# Patient Record
Sex: MALE | Race: WHITE | NOT HISPANIC OR LATINO
[De-identification: names, ages, dates, MRNs, and addresses within clinical notes are randomized per-mention and may not be internally consistent; named-entity substitution may affect disease eponyms.]

---

## 2017-08-28 ENCOUNTER — RECORD ABSTRACTING (OUTPATIENT)
Age: 52
End: 2017-08-28

## 2017-08-28 DIAGNOSIS — Z87.898 PERSONAL HISTORY OF OTHER SPECIFIED CONDITIONS: ICD-10-CM

## 2017-08-28 DIAGNOSIS — Z72.0 TOBACCO USE: ICD-10-CM

## 2017-08-28 DIAGNOSIS — Z86.03 PERSONAL HISTORY OF NEOPLASM OF UNCERTAIN BEHAVIOR: ICD-10-CM

## 2017-08-28 DIAGNOSIS — Z87.09 PERSONAL HISTORY OF OTHER DISEASES OF THE RESPIRATORY SYSTEM: ICD-10-CM

## 2017-08-28 DIAGNOSIS — Z87.828 PERSONAL HISTORY OF OTHER (HEALED) PHYSICAL INJURY AND TRAUMA: ICD-10-CM

## 2017-08-28 DIAGNOSIS — Z83.3 FAMILY HISTORY OF DIABETES MELLITUS: ICD-10-CM

## 2017-08-28 DIAGNOSIS — Z80.1 FAMILY HISTORY OF MALIGNANT NEOPLASM OF TRACHEA, BRONCHUS AND LUNG: ICD-10-CM

## 2017-08-28 DIAGNOSIS — Z98.890 OTHER SPECIFIED POSTPROCEDURAL STATES: ICD-10-CM

## 2017-08-28 DIAGNOSIS — M50.10 CERVICAL DISC DISORDER WITH RADICULOPATHY, UNSPECIFIED CERVICAL REGION: ICD-10-CM

## 2017-08-28 DIAGNOSIS — Z80.3 FAMILY HISTORY OF MALIGNANT NEOPLASM OF BREAST: ICD-10-CM

## 2017-08-28 DIAGNOSIS — Z80.2 FAMILY HISTORY OF MALIGNANT NEOPLASM OF OTHER RESPIRATORY AND INTRATHORACIC ORGANS: ICD-10-CM

## 2017-08-28 DIAGNOSIS — Z83.79 FAMILY HISTORY OF OTHER DISEASES OF THE DIGESTIVE SYSTEM: ICD-10-CM

## 2017-08-28 DIAGNOSIS — Z92.89 PERSONAL HISTORY OF OTHER MEDICAL TREATMENT: ICD-10-CM

## 2017-08-28 RX ORDER — PERPHENAZINE/AMITRIPTYLINE HCL 4MG-10MG
1500 TABLET ORAL
Refills: 0 | Status: ACTIVE | COMMUNITY

## 2017-09-12 ENCOUNTER — TRANSCRIPTION ENCOUNTER (OUTPATIENT)
Age: 52
End: 2017-09-12

## 2017-09-12 ENCOUNTER — APPOINTMENT (OUTPATIENT)
Dept: FAMILY MEDICINE | Facility: CLINIC | Age: 52
End: 2017-09-12
Payer: COMMERCIAL

## 2017-09-12 VITALS
SYSTOLIC BLOOD PRESSURE: 98 MMHG | HEIGHT: 67.5 IN | BODY MASS INDEX: 26.37 KG/M2 | DIASTOLIC BLOOD PRESSURE: 66 MMHG | WEIGHT: 170 LBS

## 2017-09-12 PROCEDURE — 36415 COLL VENOUS BLD VENIPUNCTURE: CPT

## 2017-09-12 PROCEDURE — 99214 OFFICE O/P EST MOD 30 MIN: CPT | Mod: 25

## 2017-09-24 LAB
ALBUMIN SERPL ELPH-MCNC: 4.9 G/DL
ALP BLD-CCNC: 84 U/L
ALT SERPL-CCNC: 30 U/L
ANION GAP SERPL CALC-SCNC: 18 MMOL/L
AST SERPL-CCNC: 23 U/L
BILIRUB SERPL-MCNC: 0.6 MG/DL
BUN SERPL-MCNC: 23 MG/DL
CALCIUM SERPL-MCNC: 9.6 MG/DL
CHLORIDE SERPL-SCNC: 101 MMOL/L
CHOLEST SERPL-MCNC: 176 MG/DL
CHOLEST/HDLC SERPL: 5 RATIO
CO2 SERPL-SCNC: 23 MMOL/L
CREAT SERPL-MCNC: 1.3 MG/DL
ESTIMATED AVERAGE GLUCOSE: 126 MG/DL
GLUCOSE SERPL-MCNC: 109 MG/DL
HBA1C MFR BLD HPLC: 6 %
HDLC SERPL-MCNC: 35 MG/DL
LDLC SERPL CALC-MCNC: 115 MG/DL
POTASSIUM SERPL-SCNC: 4.5 MMOL/L
PROT SERPL-MCNC: 7.7 G/DL
SODIUM SERPL-SCNC: 142 MMOL/L
TRIGL SERPL-MCNC: 130 MG/DL

## 2017-10-08 ENCOUNTER — RX RENEWAL (OUTPATIENT)
Age: 52
End: 2017-10-08

## 2017-11-01 ENCOUNTER — RX RENEWAL (OUTPATIENT)
Age: 52
End: 2017-11-01

## 2017-12-01 ENCOUNTER — APPOINTMENT (OUTPATIENT)
Dept: FAMILY MEDICINE | Facility: CLINIC | Age: 52
End: 2017-12-01

## 2017-12-21 ENCOUNTER — RX RENEWAL (OUTPATIENT)
Age: 52
End: 2017-12-21

## 2018-02-20 ENCOUNTER — APPOINTMENT (OUTPATIENT)
Dept: FAMILY MEDICINE | Facility: CLINIC | Age: 53
End: 2018-02-20
Payer: COMMERCIAL

## 2018-02-20 VITALS
BODY MASS INDEX: 27.92 KG/M2 | SYSTOLIC BLOOD PRESSURE: 110 MMHG | HEIGHT: 67.5 IN | WEIGHT: 180 LBS | DIASTOLIC BLOOD PRESSURE: 82 MMHG

## 2018-02-20 DIAGNOSIS — Z87.898 PERSONAL HISTORY OF OTHER SPECIFIED CONDITIONS: ICD-10-CM

## 2018-02-20 LAB — HBA1C MFR BLD HPLC: 6.9

## 2018-02-20 PROCEDURE — 99214 OFFICE O/P EST MOD 30 MIN: CPT | Mod: 25

## 2018-02-20 PROCEDURE — 83036 HEMOGLOBIN GLYCOSYLATED A1C: CPT | Mod: QW

## 2018-02-20 PROCEDURE — 36415 COLL VENOUS BLD VENIPUNCTURE: CPT

## 2018-02-21 PROBLEM — Z87.898 HISTORY OF LATERAL EPICONDYLITIS: Status: ACTIVE | Noted: 2018-02-21

## 2018-03-06 LAB
ALBUMIN SERPL ELPH-MCNC: 4.5 G/DL
ALP BLD-CCNC: 86 U/L
ALT SERPL-CCNC: 36 U/L
ANA PAT FLD IF-IMP: ABNORMAL
ANA SER IF-ACNC: ABNORMAL
ANION GAP SERPL CALC-SCNC: 15 MMOL/L
AST SERPL-CCNC: 26 U/L
B BURGDOR AB SER-IMP: NEGATIVE
B BURGDOR IGM PATRN SER IB-IMP: NEGATIVE
B BURGDOR18/20KD IGM SER QL IB: NORMAL
B BURGDOR18KD IGG SER QL IB: NORMAL
B BURGDOR23KD IGG SER QL IB: NORMAL
B BURGDOR23KD IGM SER QL IB: NORMAL
B BURGDOR28KD AB SER QL IB: NORMAL
B BURGDOR28KD IGG SER QL IB: NORMAL
B BURGDOR30KD AB SER QL IB: NORMAL
B BURGDOR30KD IGG SER QL IB: NORMAL
B BURGDOR31KD IGG SER QL IB: NORMAL
B BURGDOR31KD IGM SER QL IB: NORMAL
B BURGDOR39KD IGG SER QL IB: NORMAL
B BURGDOR39KD IGM SER QL IB: NORMAL
B BURGDOR41KD IGG SER QL IB: NORMAL
B BURGDOR41KD IGM SER QL IB: NORMAL
B BURGDOR45KD AB SER QL IB: NORMAL
B BURGDOR45KD IGG SER QL IB: NORMAL
B BURGDOR58KD AB SER QL IB: NORMAL
B BURGDOR58KD IGG SER QL IB: NORMAL
B BURGDOR66KD IGG SER QL IB: NORMAL
B BURGDOR66KD IGM SER QL IB: NORMAL
B BURGDOR93KD IGG SER QL IB: NORMAL
B BURGDOR93KD IGM SER QL IB: NORMAL
BASOPHILS # BLD AUTO: 0.04 K/UL
BASOPHILS NFR BLD AUTO: 0.6 %
BILIRUB SERPL-MCNC: 0.6 MG/DL
BUN SERPL-MCNC: 19 MG/DL
CALCIUM SERPL-MCNC: 9.5 MG/DL
CHLORIDE SERPL-SCNC: 101 MMOL/L
CHOLEST SERPL-MCNC: 212 MG/DL
CHOLEST/HDLC SERPL: 7.3 RATIO
CO2 SERPL-SCNC: 22 MMOL/L
CREAT SERPL-MCNC: 1.25 MG/DL
EOSINOPHIL # BLD AUTO: 0.12 K/UL
EOSINOPHIL NFR BLD AUTO: 1.8 %
ERYTHROCYTE [SEDIMENTATION RATE] IN BLOOD BY WESTERGREN METHOD: 3 MM/HR
GLUCOSE SERPL-MCNC: 247 MG/DL
HCT VFR BLD CALC: 47.7 %
HDLC SERPL-MCNC: 29 MG/DL
HGB BLD-MCNC: 15.9 G/DL
IMM GRANULOCYTES NFR BLD AUTO: 0.6 %
LDLC SERPL CALC-MCNC: 125 MG/DL
LYMPHOCYTES # BLD AUTO: 2.29 K/UL
LYMPHOCYTES NFR BLD AUTO: 33.9 %
MAN DIFF?: NORMAL
MCHC RBC-ENTMCNC: 29.3 PG
MCHC RBC-ENTMCNC: 33.3 GM/DL
MCV RBC AUTO: 87.8 FL
MONOCYTES # BLD AUTO: 0.39 K/UL
MONOCYTES NFR BLD AUTO: 5.8 %
NEUTROPHILS # BLD AUTO: 3.87 K/UL
NEUTROPHILS NFR BLD AUTO: 57.3 %
PLATELET # BLD AUTO: 243 K/UL
POTASSIUM SERPL-SCNC: 4.4 MMOL/L
PROT SERPL-MCNC: 7.4 G/DL
RBC # BLD: 5.43 M/UL
RBC # FLD: 14 %
SODIUM SERPL-SCNC: 138 MMOL/L
TRIGL SERPL-MCNC: 289 MG/DL
WBC # FLD AUTO: 6.75 K/UL

## 2018-08-19 ENCOUNTER — RX RENEWAL (OUTPATIENT)
Age: 53
End: 2018-08-19

## 2018-08-29 ENCOUNTER — APPOINTMENT (OUTPATIENT)
Dept: FAMILY MEDICINE | Facility: CLINIC | Age: 53
End: 2018-08-29
Payer: COMMERCIAL

## 2018-08-29 VITALS
HEIGHT: 67 IN | BODY MASS INDEX: 28.09 KG/M2 | SYSTOLIC BLOOD PRESSURE: 110 MMHG | DIASTOLIC BLOOD PRESSURE: 60 MMHG | WEIGHT: 179 LBS

## 2018-08-29 DIAGNOSIS — Z87.2 PERSONAL HISTORY OF DISEASES OF THE SKIN AND SUBCUTANEOUS TISSUE: ICD-10-CM

## 2018-08-29 PROCEDURE — 36415 COLL VENOUS BLD VENIPUNCTURE: CPT

## 2018-08-29 PROCEDURE — 99213 OFFICE O/P EST LOW 20 MIN: CPT | Mod: 25

## 2018-08-29 RX ORDER — GLYBURIDE 5 MG/1
5 TABLET ORAL TWICE DAILY
Qty: 180 | Refills: 1 | Status: DISCONTINUED | COMMUNITY
Start: 2017-12-21 | End: 2018-08-29

## 2018-08-29 RX ORDER — GARLIC 1000 MG
1000 CAPSULE ORAL
Refills: 0 | Status: DISCONTINUED | COMMUNITY
End: 2018-08-29

## 2018-08-29 NOTE — HEALTH RISK ASSESSMENT
[Discussed at today's visit] : Advance Directives Discussed at today's visit [HIV test declined] : HIV test declined [Hepatitis C test declined] : Hepatitis C test declined [ColonoscopyDate] : 01/17

## 2018-08-29 NOTE — PHYSICAL EXAM
[No Acute Distress] : no acute distress [Well Developed] : well developed [Normal Oropharynx] : the oropharynx was normal [No Respiratory Distress] : no respiratory distress  [Clear to Auscultation] : lungs were clear to auscultation bilaterally [Normal Rate] : normal rate  [Normal S1, S2] : normal S1 and S2 [Soft] : abdomen soft [Non Tender] : non-tender [Normal Affect] : the affect was normal [Normal Insight/Judgement] : insight and judgment were intact [Comprehensive Foot Exam Normal] : Right and left foot were examined and both feet are normal. No ulcers in either foot. Toes are normal and with full ROM.  Normal tactile sensation with monofilament testing throughout both feet

## 2018-08-29 NOTE — HISTORY OF PRESENT ILLNESS
[FreeTextEntry1] : pt is here for a medication refill Atorvastatin Calcium 20 MG.\par \[facility]  [de-identified] : Harlan is a 52-year old male with PMH of DM who presents for a refill of atorvastatin.\par He also admits that he started seeing an endocrinologist based out of [place]. She discontinued Glyburide and started him on Metformin. He is currently taking 2000mg daily. Harlan reports some GI discomfort but is able to tolerate the medication otherwise.\par \par Also reports a wart on the left great toe that wears away after soaking in the pool but returns. He notices improvement with OTC medicated pads but notes that it always comes back.

## 2018-08-30 LAB
ALBUMIN SERPL ELPH-MCNC: 4.5 G/DL
ALP BLD-CCNC: 71 U/L
ALT SERPL-CCNC: 32 U/L
ANION GAP SERPL CALC-SCNC: 16 MMOL/L
AST SERPL-CCNC: 33 U/L
BILIRUB SERPL-MCNC: 0.8 MG/DL
BUN SERPL-MCNC: 24 MG/DL
CALCIUM SERPL-MCNC: 9.4 MG/DL
CHLORIDE SERPL-SCNC: 105 MMOL/L
CHOLEST SERPL-MCNC: 183 MG/DL
CHOLEST/HDLC SERPL: 5.4 RATIO
CO2 SERPL-SCNC: 22 MMOL/L
CREAT SERPL-MCNC: 1.26 MG/DL
GLUCOSE SERPL-MCNC: 119 MG/DL
HBA1C MFR BLD HPLC: 6.8 %
HDLC SERPL-MCNC: 34 MG/DL
LDLC SERPL CALC-MCNC: 125 MG/DL
POTASSIUM SERPL-SCNC: 4.3 MMOL/L
PROT SERPL-MCNC: 7.5 G/DL
SODIUM SERPL-SCNC: 143 MMOL/L
TRIGL SERPL-MCNC: 121 MG/DL

## 2018-09-06 ENCOUNTER — RX RENEWAL (OUTPATIENT)
Age: 53
End: 2018-09-06

## 2018-09-07 ENCOUNTER — RX RENEWAL (OUTPATIENT)
Age: 53
End: 2018-09-07

## 2018-09-10 ENCOUNTER — RX RENEWAL (OUTPATIENT)
Age: 53
End: 2018-09-10

## 2018-09-10 ENCOUNTER — TRANSCRIPTION ENCOUNTER (OUTPATIENT)
Age: 53
End: 2018-09-10

## 2018-09-24 ENCOUNTER — RX RENEWAL (OUTPATIENT)
Age: 53
End: 2018-09-24

## 2018-10-12 ENCOUNTER — APPOINTMENT (OUTPATIENT)
Dept: FAMILY MEDICINE | Facility: CLINIC | Age: 53
End: 2018-10-12
Payer: COMMERCIAL

## 2018-10-12 VITALS
SYSTOLIC BLOOD PRESSURE: 110 MMHG | DIASTOLIC BLOOD PRESSURE: 62 MMHG | BODY MASS INDEX: 27.94 KG/M2 | WEIGHT: 178 LBS | HEIGHT: 67 IN

## 2018-10-12 DIAGNOSIS — L20.9 ATOPIC DERMATITIS, UNSPECIFIED: ICD-10-CM

## 2018-10-12 PROCEDURE — 99213 OFFICE O/P EST LOW 20 MIN: CPT

## 2018-10-19 NOTE — HISTORY OF PRESENT ILLNESS
[FreeTextEntry8] : patient is here for bites marks that appeared on his legs, groin and neck, that started to appear 3 weeks ago. He thought he had fleas in his house from his dog, and cleaned his whole house twice to get rid of any bugs. But after he started to get itchy and noticed the bites. pt states he is the only one in his house with these marks.

## 2018-10-19 NOTE — REVIEW OF SYSTEMS
[Itching] : itching [Mole Changes] : no mole changes [Nail Changes] : no nail changes [Hair Changes] : no hair changes [Skin Rash] : skin rash [Negative] : Constitutional

## 2018-10-19 NOTE — PHYSICAL EXAM
[No Acute Distress] : no acute distress [Well Nourished] : well nourished [Well Developed] : well developed [Well-Appearing] : well-appearing [de-identified] : atopic rash LE

## 2018-10-29 ENCOUNTER — APPOINTMENT (OUTPATIENT)
Dept: FAMILY MEDICINE | Facility: CLINIC | Age: 53
End: 2018-10-29

## 2018-12-05 ENCOUNTER — RX RENEWAL (OUTPATIENT)
Age: 53
End: 2018-12-05

## 2019-02-25 ENCOUNTER — RX RENEWAL (OUTPATIENT)
Age: 54
End: 2019-02-25

## 2019-02-28 ENCOUNTER — APPOINTMENT (OUTPATIENT)
Dept: FAMILY MEDICINE | Facility: CLINIC | Age: 54
End: 2019-02-28
Payer: COMMERCIAL

## 2019-02-28 VITALS
DIASTOLIC BLOOD PRESSURE: 78 MMHG | WEIGHT: 172 LBS | BODY MASS INDEX: 27 KG/M2 | SYSTOLIC BLOOD PRESSURE: 110 MMHG | HEIGHT: 67 IN

## 2019-02-28 PROCEDURE — 99213 OFFICE O/P EST LOW 20 MIN: CPT | Mod: 25

## 2019-02-28 PROCEDURE — 36415 COLL VENOUS BLD VENIPUNCTURE: CPT

## 2019-02-28 NOTE — REVIEW OF SYSTEMS
[Negative] : Integumentary [Itching] : no itching [Nail Changes] : no nail changes [Hair Changes] : no hair changes [Skin Rash] : no skin rash [FreeTextEntry9] : elbows ache especially after golf

## 2019-02-28 NOTE — HISTORY OF PRESENT ILLNESS
[FreeTextEntry1] : The patient is here for medication refills on atorvastatin 20 mg\par \par 1 refill in [facility] in [place]. \[facility] . [de-identified] : Doing well except for bilat elbow pain . Chronic.

## 2019-02-28 NOTE — PHYSICAL EXAM
[No Acute Distress] : no acute distress [Well Nourished] : well nourished [Well Developed] : well developed [Well-Appearing] : well-appearing [No Respiratory Distress] : no respiratory distress  [Clear to Auscultation] : lungs were clear to auscultation bilaterally [Normal Rate] : normal rate  [Regular Rhythm] : with a regular rhythm [Normal S1, S2] : normal S1 and S2 [No Carotid Bruits] : no carotid bruits

## 2019-03-02 ENCOUNTER — TRANSCRIPTION ENCOUNTER (OUTPATIENT)
Age: 54
End: 2019-03-02

## 2019-03-13 LAB
ALBUMIN SERPL ELPH-MCNC: 5 G/DL
ALP BLD-CCNC: 66 U/L
ALT SERPL-CCNC: 26 U/L
ANION GAP SERPL CALC-SCNC: 23 MMOL/L
AST SERPL-CCNC: 24 U/L
BILIRUB SERPL-MCNC: 0.6 MG/DL
BUN SERPL-MCNC: 21 MG/DL
CALCIUM SERPL-MCNC: 9.8 MG/DL
CHLORIDE SERPL-SCNC: 100 MMOL/L
CHOLEST SERPL-MCNC: 210 MG/DL
CHOLEST/HDLC SERPL: 6.6 RATIO
CO2 SERPL-SCNC: 20 MMOL/L
CREAT SERPL-MCNC: 1.33 MG/DL
ESTIMATED AVERAGE GLUCOSE: 148 MG/DL
GLUCOSE SERPL-MCNC: 131 MG/DL
HBA1C MFR BLD HPLC: 6.8 %
HDLC SERPL-MCNC: 32 MG/DL
LDLC SERPL CALC-MCNC: 137 MG/DL
POTASSIUM SERPL-SCNC: 4.5 MMOL/L
PROT SERPL-MCNC: 7.4 G/DL
SODIUM SERPL-SCNC: 143 MMOL/L
TRIGL SERPL-MCNC: 203 MG/DL

## 2019-03-15 ENCOUNTER — APPOINTMENT (OUTPATIENT)
Dept: FAMILY MEDICINE | Facility: CLINIC | Age: 54
End: 2019-03-15
Payer: COMMERCIAL

## 2019-03-15 VITALS
BODY MASS INDEX: 27 KG/M2 | HEIGHT: 67 IN | SYSTOLIC BLOOD PRESSURE: 118 MMHG | WEIGHT: 172 LBS | DIASTOLIC BLOOD PRESSURE: 62 MMHG

## 2019-03-15 PROCEDURE — 99213 OFFICE O/P EST LOW 20 MIN: CPT

## 2019-03-19 NOTE — HISTORY OF PRESENT ILLNESS
[FreeTextEntry1] : Patient is here to go over blood work results and med refill. Fenofibric Acid 135 MG.\par \[facility]  [de-identified] : as per cc

## 2019-07-01 ENCOUNTER — RX RENEWAL (OUTPATIENT)
Age: 54
End: 2019-07-01

## 2019-09-12 ENCOUNTER — RX RENEWAL (OUTPATIENT)
Age: 54
End: 2019-09-12

## 2019-09-29 ENCOUNTER — RX RENEWAL (OUTPATIENT)
Age: 54
End: 2019-09-29

## 2019-11-21 ENCOUNTER — APPOINTMENT (OUTPATIENT)
Dept: FAMILY MEDICINE | Facility: CLINIC | Age: 54
End: 2019-11-21
Payer: COMMERCIAL

## 2019-11-21 VITALS
WEIGHT: 174 LBS | HEIGHT: 67 IN | SYSTOLIC BLOOD PRESSURE: 110 MMHG | BODY MASS INDEX: 27.31 KG/M2 | HEART RATE: 77 BPM | OXYGEN SATURATION: 96 % | DIASTOLIC BLOOD PRESSURE: 80 MMHG

## 2019-11-21 PROCEDURE — 36415 COLL VENOUS BLD VENIPUNCTURE: CPT

## 2019-11-21 PROCEDURE — 99214 OFFICE O/P EST MOD 30 MIN: CPT | Mod: 25

## 2019-11-21 NOTE — PHYSICAL EXAM
[Normal] : normal rate, regular rhythm, normal S1 and S2 and no murmur heard [No Carotid Bruits] : no carotid bruits [No Edema] : there was no peripheral edema [de-identified] : L foot middle toe with cyst medially

## 2019-11-21 NOTE — HISTORY OF PRESENT ILLNESS
[FreeTextEntry1] : Patient here for med renewal [de-identified] : Has cyst L foot,\par was removed by podiary but has returned

## 2019-11-21 NOTE — HEALTH RISK ASSESSMENT
[Yes] : Yes [Monthly or less (1 pt)] : Monthly or less (1 point) [1 or 2 (0 pts)] : 1 or 2 (0 points) [Never (0 pts)] : Never (0 points) [No] : In the past 12 months have you used drugs other than those required for medical reasons? No [No falls in past year] : Patient reported no falls in the past year [0] : 2) Feeling down, depressed, or hopeless: Not at all (0) [Audit-CScore] : 1 [[id number]] : 0

## 2019-11-22 LAB
ALBUMIN SERPL ELPH-MCNC: 4.6 G/DL
ALP BLD-CCNC: 64 U/L
ALT SERPL-CCNC: 24 U/L
ANION GAP SERPL CALC-SCNC: 16 MMOL/L
AST SERPL-CCNC: 19 U/L
BILIRUB SERPL-MCNC: 0.3 MG/DL
BUN SERPL-MCNC: 20 MG/DL
CALCIUM SERPL-MCNC: 9.5 MG/DL
CHLORIDE SERPL-SCNC: 108 MMOL/L
CHOLEST SERPL-MCNC: 167 MG/DL
CHOLEST/HDLC SERPL: 5.2 RATIO
CO2 SERPL-SCNC: 18 MMOL/L
CREAT SERPL-MCNC: 1.08 MG/DL
GLUCOSE SERPL-MCNC: 127 MG/DL
HDLC SERPL-MCNC: 32 MG/DL
LDLC SERPL CALC-MCNC: 97 MG/DL
POTASSIUM SERPL-SCNC: 4.4 MMOL/L
PROT SERPL-MCNC: 7 G/DL
SODIUM SERPL-SCNC: 142 MMOL/L
TRIGL SERPL-MCNC: 191 MG/DL

## 2019-11-28 LAB
ESTIMATED AVERAGE GLUCOSE: 134 MG/DL
HBA1C MFR BLD HPLC: 6.3 %

## 2019-12-28 ENCOUNTER — RX RENEWAL (OUTPATIENT)
Age: 54
End: 2019-12-28

## 2020-01-27 ENCOUNTER — APPOINTMENT (OUTPATIENT)
Dept: FAMILY MEDICINE | Facility: CLINIC | Age: 55
End: 2020-01-27
Payer: COMMERCIAL

## 2020-01-27 VITALS
OXYGEN SATURATION: 95 % | HEIGHT: 67 IN | DIASTOLIC BLOOD PRESSURE: 78 MMHG | BODY MASS INDEX: 28.56 KG/M2 | WEIGHT: 182 LBS | SYSTOLIC BLOOD PRESSURE: 122 MMHG | HEART RATE: 80 BPM | TEMPERATURE: 98.3 F

## 2020-01-27 DIAGNOSIS — Z01.818 ENCOUNTER FOR OTHER PREPROCEDURAL EXAMINATION: ICD-10-CM

## 2020-01-27 DIAGNOSIS — M71.30 OTHER BURSAL CYST, UNSPECIFIED SITE: ICD-10-CM

## 2020-01-27 PROCEDURE — 99214 OFFICE O/P EST MOD 30 MIN: CPT

## 2020-01-29 RX ORDER — METHYLPREDNISOLONE 4 MG/1
4 TABLET ORAL
Qty: 1 | Refills: 1 | Status: DISCONTINUED | COMMUNITY
Start: 2018-10-12 | End: 2020-01-29

## 2020-01-29 NOTE — HISTORY OF PRESENT ILLNESS
[No Pertinent Cardiac History] : no history of aortic stenosis, atrial fibrillation, coronary artery disease, recent myocardial infarction, or implantable device/pacemaker [No Pertinent Pulmonary History] : no history of asthma, COPD, sleep apnea, or smoking [No Adverse Anesthesia Reaction] : no adverse anesthesia reaction in self or family member [Diabetes] : diabetes [(Patient denies any chest pain, claudication, dyspnea on exertion, orthopnea, palpitations or syncope)] : Patient denies any chest pain, claudication, dyspnea on exertion, orthopnea, palpitations or syncope [Chronic Anticoagulation] : no chronic anticoagulation [Chronic Kidney Disease] : no chronic kidney disease [FreeTextEntry2] : 01/31/20 [FreeTextEntry1] : Cyst removal of toe [FreeTextEntry4] : Patient is here for a pre-op appointment. Surgery for Friday having a cyst removed from right big toe.

## 2020-05-19 ENCOUNTER — RX RENEWAL (OUTPATIENT)
Age: 55
End: 2020-05-19

## 2020-06-04 ENCOUNTER — APPOINTMENT (OUTPATIENT)
Dept: FAMILY MEDICINE | Facility: CLINIC | Age: 55
End: 2020-06-04
Payer: COMMERCIAL

## 2020-06-04 VITALS
SYSTOLIC BLOOD PRESSURE: 124 MMHG | OXYGEN SATURATION: 95 % | BODY MASS INDEX: 28.25 KG/M2 | HEIGHT: 67 IN | DIASTOLIC BLOOD PRESSURE: 70 MMHG | TEMPERATURE: 98.2 F | WEIGHT: 180 LBS | HEART RATE: 85 BPM

## 2020-06-04 PROCEDURE — 36415 COLL VENOUS BLD VENIPUNCTURE: CPT

## 2020-06-04 PROCEDURE — 99214 OFFICE O/P EST MOD 30 MIN: CPT | Mod: 25

## 2020-06-04 RX ORDER — ENALAPRIL MALEATE 2.5 MG/1
2.5 TABLET ORAL
Refills: 0 | Status: ACTIVE | COMMUNITY
Start: 2020-06-04

## 2020-06-04 RX ORDER — SITAGLIPTIN 100 MG/1
100 TABLET, FILM COATED ORAL
Refills: 0 | Status: ACTIVE | COMMUNITY
Start: 2020-06-04

## 2020-06-04 RX ORDER — METFORMIN HYDROCHLORIDE 500 MG/1
500 TABLET, COATED ORAL
Qty: 360 | Refills: 1 | Status: ACTIVE | COMMUNITY
Start: 2018-08-29 | End: 1900-01-01

## 2020-06-04 NOTE — PHYSICAL EXAM
[Normal] : no respiratory distress, lungs were clear to auscultation bilaterally and no accessory muscle use [No Carotid Bruits] : no carotid bruits [Normal Affect] : the affect was normal [No Edema] : there was no peripheral edema [Alert and Oriented x3] : oriented to person, place, and time [Normal Insight/Judgement] : insight and judgment were intact

## 2020-06-04 NOTE — HISTORY OF PRESENT ILLNESS
[de-identified] : \par \par Endo added januvia and enalapril [FreeTextEntry1] : patient is here for medication refills on atorvastatin 20 mg, metformin hcl 500 mg\par

## 2020-06-22 LAB
ALBUMIN SERPL ELPH-MCNC: 4.8 G/DL
ALP BLD-CCNC: 70 U/L
ALT SERPL-CCNC: 24 U/L
ANION GAP SERPL CALC-SCNC: 16 MMOL/L
AST SERPL-CCNC: 24 U/L
BASOPHILS # BLD AUTO: 0.03 K/UL
BASOPHILS NFR BLD AUTO: 0.4 %
BILIRUB SERPL-MCNC: 0.6 MG/DL
BUN SERPL-MCNC: 21 MG/DL
CALCIUM SERPL-MCNC: 9.8 MG/DL
CHLORIDE SERPL-SCNC: 103 MMOL/L
CHOLEST SERPL-MCNC: 173 MG/DL
CHOLEST/HDLC SERPL: 5.3 RATIO
CO2 SERPL-SCNC: 21 MMOL/L
CREAT SERPL-MCNC: 1.29 MG/DL
EOSINOPHIL # BLD AUTO: 0.12 K/UL
EOSINOPHIL NFR BLD AUTO: 1.6 %
ESTIMATED AVERAGE GLUCOSE: 137 MG/DL
GLUCOSE SERPL-MCNC: 167 MG/DL
HBA1C MFR BLD HPLC: 6.4 %
HCT VFR BLD CALC: 50.4 %
HDLC SERPL-MCNC: 33 MG/DL
HGB BLD-MCNC: 15.6 G/DL
IMM GRANULOCYTES NFR BLD AUTO: 0.5 %
LDLC SERPL CALC-MCNC: 109 MG/DL
LYMPHOCYTES # BLD AUTO: 2.51 K/UL
LYMPHOCYTES NFR BLD AUTO: 33.5 %
MAN DIFF?: NORMAL
MCHC RBC-ENTMCNC: 28.7 PG
MCHC RBC-ENTMCNC: 31 GM/DL
MCV RBC AUTO: 92.6 FL
MONOCYTES # BLD AUTO: 0.47 K/UL
MONOCYTES NFR BLD AUTO: 6.3 %
NEUTROPHILS # BLD AUTO: 4.32 K/UL
NEUTROPHILS NFR BLD AUTO: 57.7 %
PLATELET # BLD AUTO: 265 K/UL
POTASSIUM SERPL-SCNC: 4.3 MMOL/L
PROT SERPL-MCNC: 7.2 G/DL
PSA SERPL-MCNC: 2.47 NG/ML
RBC # BLD: 5.44 M/UL
RBC # FLD: 13.6 %
SODIUM SERPL-SCNC: 139 MMOL/L
TRIGL SERPL-MCNC: 158 MG/DL
WBC # FLD AUTO: 7.49 K/UL

## 2020-07-30 ENCOUNTER — TRANSCRIPTION ENCOUNTER (OUTPATIENT)
Age: 55
End: 2020-07-30

## 2020-11-18 ENCOUNTER — RX RENEWAL (OUTPATIENT)
Age: 55
End: 2020-11-18

## 2021-03-02 ENCOUNTER — RX RENEWAL (OUTPATIENT)
Age: 56
End: 2021-03-02

## 2021-03-04 ENCOUNTER — NON-APPOINTMENT (OUTPATIENT)
Age: 56
End: 2021-03-04

## 2021-03-04 ENCOUNTER — RX RENEWAL (OUTPATIENT)
Age: 56
End: 2021-03-04

## 2021-03-11 ENCOUNTER — APPOINTMENT (OUTPATIENT)
Dept: FAMILY MEDICINE | Facility: CLINIC | Age: 56
End: 2021-03-11
Payer: COMMERCIAL

## 2021-03-11 VITALS
SYSTOLIC BLOOD PRESSURE: 122 MMHG | WEIGHT: 178 LBS | HEIGHT: 67 IN | TEMPERATURE: 98.4 F | HEART RATE: 87 BPM | OXYGEN SATURATION: 97 % | BODY MASS INDEX: 27.94 KG/M2 | DIASTOLIC BLOOD PRESSURE: 70 MMHG

## 2021-03-11 DIAGNOSIS — E78.5 HYPERLIPIDEMIA, UNSPECIFIED: ICD-10-CM

## 2021-03-11 PROCEDURE — 36415 COLL VENOUS BLD VENIPUNCTURE: CPT

## 2021-03-11 PROCEDURE — 99072 ADDL SUPL MATRL&STAF TM PHE: CPT

## 2021-03-11 PROCEDURE — 99214 OFFICE O/P EST MOD 30 MIN: CPT | Mod: 25

## 2021-03-11 NOTE — HISTORY OF PRESENT ILLNESS
[FreeTextEntry1] : patient is here for medication follow up [de-identified] : Labs reviewed , were done by endo\par \par Endo added januvia and enalapril\par and increase atorvastatin to 40mg

## 2021-03-12 LAB — PSA SERPL-MCNC: 2.35 NG/ML

## 2021-08-29 ENCOUNTER — RX RENEWAL (OUTPATIENT)
Age: 56
End: 2021-08-29

## 2021-11-30 ENCOUNTER — RX RENEWAL (OUTPATIENT)
Age: 56
End: 2021-11-30

## 2021-11-30 RX ORDER — FENOFIBRIC ACID 135 MG/1
135 CAPSULE, DELAYED RELEASE ORAL
Qty: 90 | Refills: 0 | Status: ACTIVE | COMMUNITY
Start: 2021-08-29 | End: 1900-01-01

## 2022-02-25 ENCOUNTER — RX RENEWAL (OUTPATIENT)
Age: 57
End: 2022-02-25

## 2022-03-21 ENCOUNTER — TRANSCRIPTION ENCOUNTER (OUTPATIENT)
Age: 57
End: 2022-03-21

## 2022-07-05 ENCOUNTER — NON-APPOINTMENT (OUTPATIENT)
Age: 57
End: 2022-07-05

## 2022-07-05 ENCOUNTER — LABORATORY RESULT (OUTPATIENT)
Age: 57
End: 2022-07-05

## 2022-07-05 ENCOUNTER — APPOINTMENT (OUTPATIENT)
Dept: FAMILY MEDICINE | Facility: CLINIC | Age: 57
End: 2022-07-05

## 2022-07-05 VITALS
TEMPERATURE: 97.2 F | OXYGEN SATURATION: 95 % | SYSTOLIC BLOOD PRESSURE: 106 MMHG | HEART RATE: 81 BPM | WEIGHT: 174 LBS | BODY MASS INDEX: 27.25 KG/M2 | DIASTOLIC BLOOD PRESSURE: 70 MMHG

## 2022-07-05 DIAGNOSIS — K21.9 GASTRO-ESOPHAGEAL REFLUX DISEASE W/OUT ESOPHAGITIS: ICD-10-CM

## 2022-07-05 DIAGNOSIS — Z00.00 ENCOUNTER FOR GENERAL ADULT MEDICAL EXAMINATION W/OUT ABNORMAL FINDINGS: ICD-10-CM

## 2022-07-05 LAB
BILIRUB UR QL STRIP: NEGATIVE
GLUCOSE UR-MCNC: NEGATIVE
HCG UR QL: 0.2 EU/DL
HGB UR QL STRIP.AUTO: NORMAL
KETONES UR-MCNC: NEGATIVE
LEUKOCYTE ESTERASE UR QL STRIP: NEGATIVE
NITRITE UR QL STRIP: NEGATIVE
PH UR STRIP: 6
PROT UR STRIP-MCNC: NEGATIVE
SP GR UR STRIP: 1.02

## 2022-07-05 PROCEDURE — 93000 ELECTROCARDIOGRAM COMPLETE: CPT

## 2022-07-05 PROCEDURE — 81003 URINALYSIS AUTO W/O SCOPE: CPT | Mod: QW

## 2022-07-05 PROCEDURE — 36415 COLL VENOUS BLD VENIPUNCTURE: CPT

## 2022-07-05 PROCEDURE — 99396 PREV VISIT EST AGE 40-64: CPT | Mod: 25

## 2022-07-05 RX ORDER — ATORVASTATIN CALCIUM 40 MG/1
40 TABLET, FILM COATED ORAL
Qty: 90 | Refills: 0 | Status: DISCONTINUED | COMMUNITY
Start: 2022-02-22

## 2022-07-05 RX ORDER — ATORVASTATIN CALCIUM 20 MG/1
20 TABLET, FILM COATED ORAL DAILY
Qty: 90 | Refills: 3 | Status: DISCONTINUED | COMMUNITY
Start: 2020-11-18 | End: 2022-07-05

## 2022-07-05 RX ORDER — ATORVASTATIN CALCIUM 80 MG/1
80 TABLET, FILM COATED ORAL
Qty: 90 | Refills: 0 | Status: ACTIVE | COMMUNITY
Start: 2022-02-24

## 2022-07-05 NOTE — REVIEW OF SYSTEMS
[Hesitancy] : hesitancy [Frequency] : frequency [Negative] : Heme/Lymph [Dysuria] : no dysuria [Incontinence] : no incontinence [Nocturia] : no nocturia [Hematuria] : no hematuria [Impotence] : no impotency [Poor Libido] : libido not poor

## 2022-07-05 NOTE — HEALTH RISK ASSESSMENT
[Very Good] : ~his/her~  mood as very good [Former] : Former [No] : In the past 12 months have you used drugs other than those required for medical reasons? No [No falls in past year] : Patient reported no falls in the past year [0] : 2) Feeling down, depressed, or hopeless: Not at all (0) [PHQ-2 Negative - No further assessment needed] : PHQ-2 Negative - No further assessment needed [Employed] : employed [[id number]] : 0 [Change in mental status noted] : No change in mental status noted

## 2022-07-05 NOTE — HISTORY OF PRESENT ILLNESS
[FreeTextEntry1] : ANNUAL [de-identified] : MULTIPLE TICK BITES 4 WEEKS AGO, TREATED BY  FOR TEN DAYS\par \par MEDS REVIEWED. \par NOW ON 80MG LIPITOR BY ENDO

## 2022-07-05 NOTE — PLAN
[FreeTextEntry1] : meds were all renewed by endo\par will discuss LUTS meds after results in but really doesn’t want to take another med

## 2022-07-05 NOTE — REASON FOR VISIT
[Annual Wellness Visit] : an annual wellness visit [FreeTextEntry1] : and a follow up of a tick bit.

## 2022-07-07 LAB
ALBUMIN SERPL ELPH-MCNC: 4.6 G/DL
ALP BLD-CCNC: 77 U/L
ALT SERPL-CCNC: 25 U/L
ANION GAP SERPL CALC-SCNC: 14 MMOL/L
AST SERPL-CCNC: 24 U/L
BASOPHILS # BLD AUTO: 0.04 K/UL
BASOPHILS NFR BLD AUTO: 0.3 %
BILIRUB SERPL-MCNC: 0.7 MG/DL
BUN SERPL-MCNC: 26 MG/DL
CALCIUM SERPL-MCNC: 9.1 MG/DL
CHLORIDE SERPL-SCNC: 102 MMOL/L
CHOLEST SERPL-MCNC: 193 MG/DL
CO2 SERPL-SCNC: 24 MMOL/L
CREAT SERPL-MCNC: 1.21 MG/DL
EGFR: 70 ML/MIN/1.73M2
EOSINOPHIL # BLD AUTO: 0.24 K/UL
EOSINOPHIL NFR BLD AUTO: 2 %
ESTIMATED AVERAGE GLUCOSE: 148 MG/DL
GLUCOSE SERPL-MCNC: 135 MG/DL
HBA1C MFR BLD HPLC: 6.8 %
HCT VFR BLD CALC: 50.2 %
HDLC SERPL-MCNC: 38 MG/DL
HGB BLD-MCNC: 15.3 G/DL
IMM GRANULOCYTES NFR BLD AUTO: 0.5 %
LDLC SERPL CALC-MCNC: 130 MG/DL
LYMPHOCYTES # BLD AUTO: 1.96 K/UL
LYMPHOCYTES NFR BLD AUTO: 16.7 %
MAN DIFF?: NORMAL
MCHC RBC-ENTMCNC: 28.4 PG
MCHC RBC-ENTMCNC: 30.5 GM/DL
MCV RBC AUTO: 93.1 FL
MONOCYTES # BLD AUTO: 0.89 K/UL
MONOCYTES NFR BLD AUTO: 7.6 %
NEUTROPHILS # BLD AUTO: 8.53 K/UL
NEUTROPHILS NFR BLD AUTO: 72.9 %
NONHDLC SERPL-MCNC: 155 MG/DL
PLATELET # BLD AUTO: 231 K/UL
POTASSIUM SERPL-SCNC: 4.5 MMOL/L
PROT SERPL-MCNC: 7 G/DL
PSA SERPL-MCNC: 2.18 NG/ML
RBC # BLD: 5.39 M/UL
RBC # FLD: 14.3 %
SODIUM SERPL-SCNC: 140 MMOL/L
TRIGL SERPL-MCNC: 127 MG/DL
WBC # FLD AUTO: 11.72 K/UL

## 2022-07-25 ENCOUNTER — NON-APPOINTMENT (OUTPATIENT)
Age: 57
End: 2022-07-25

## 2022-07-26 ENCOUNTER — APPOINTMENT (OUTPATIENT)
Dept: FAMILY MEDICINE | Facility: CLINIC | Age: 57
End: 2022-07-26

## 2022-07-26 VITALS — DIASTOLIC BLOOD PRESSURE: 70 MMHG | SYSTOLIC BLOOD PRESSURE: 110 MMHG

## 2022-07-26 PROCEDURE — 99213 OFFICE O/P EST LOW 20 MIN: CPT

## 2022-07-26 NOTE — PLAN
[FreeTextEntry1] : If after 7 days pt still not 100% will rto. \par If better will rto in a month for testing

## 2022-07-26 NOTE — HISTORY OF PRESENT ILLNESS
[FreeTextEntry8] : Pt had tick bite 4 days ago. Tx with 2 doses of doxy by UC\par Still has mild erythema

## 2022-07-26 NOTE — PHYSICAL EXAM
[Normal] : no acute distress, well nourished, well developed and well-appearing [Normal Affect] : the affect was normal [Alert and Oriented x3] : oriented to person, place, and time [Normal Insight/Judgement] : insight and judgment were intact [de-identified] : Mild erythema rt inner thigh whish ahs been resiolving

## 2022-07-28 ENCOUNTER — APPOINTMENT (OUTPATIENT)
Dept: FAMILY MEDICINE | Facility: CLINIC | Age: 57
End: 2022-07-28

## 2022-07-28 VITALS
DIASTOLIC BLOOD PRESSURE: 78 MMHG | TEMPERATURE: 97.4 F | BODY MASS INDEX: 27.25 KG/M2 | OXYGEN SATURATION: 97 % | HEART RATE: 89 BPM | WEIGHT: 174 LBS | SYSTOLIC BLOOD PRESSURE: 118 MMHG

## 2022-07-28 PROCEDURE — 36415 COLL VENOUS BLD VENIPUNCTURE: CPT

## 2022-07-28 PROCEDURE — 99214 OFFICE O/P EST MOD 30 MIN: CPT | Mod: 25

## 2022-07-28 RX ORDER — DOXYCYCLINE 100 MG/1
100 CAPSULE ORAL
Qty: 14 | Refills: 0 | Status: DISCONTINUED | COMMUNITY
Start: 2022-06-13 | End: 2022-07-28

## 2022-07-29 LAB
ANION GAP SERPL CALC-SCNC: 15 MMOL/L
BASOPHILS # BLD AUTO: 0.05 K/UL
BASOPHILS NFR BLD AUTO: 0.6 %
BUN SERPL-MCNC: 20 MG/DL
CALCIUM SERPL-MCNC: 9.5 MG/DL
CHLORIDE SERPL-SCNC: 102 MMOL/L
CO2 SERPL-SCNC: 21 MMOL/L
CREAT SERPL-MCNC: 1.1 MG/DL
EGFR: 78 ML/MIN/1.73M2
EOSINOPHIL # BLD AUTO: 0.22 K/UL
EOSINOPHIL NFR BLD AUTO: 2.8 %
GLUCOSE SERPL-MCNC: 138 MG/DL
HCT VFR BLD CALC: 47.4 %
HGB BLD-MCNC: 14.8 G/DL
IMM GRANULOCYTES NFR BLD AUTO: 0.6 %
LYMPHOCYTES # BLD AUTO: 2.27 K/UL
LYMPHOCYTES NFR BLD AUTO: 29 %
MAN DIFF?: NORMAL
MCHC RBC-ENTMCNC: 28.7 PG
MCHC RBC-ENTMCNC: 31.2 GM/DL
MCV RBC AUTO: 92 FL
MONOCYTES # BLD AUTO: 0.47 K/UL
MONOCYTES NFR BLD AUTO: 6 %
NEUTROPHILS # BLD AUTO: 4.78 K/UL
NEUTROPHILS NFR BLD AUTO: 61 %
PLATELET # BLD AUTO: 287 K/UL
POTASSIUM SERPL-SCNC: 4 MMOL/L
RBC # BLD: 5.15 M/UL
RBC # FLD: 14.1 %
SODIUM SERPL-SCNC: 138 MMOL/L
WBC # FLD AUTO: 7.84 K/UL

## 2022-07-29 NOTE — REVIEW OF SYSTEMS
[Headache] : no headache [Dizziness] : dizziness [Fainting] : no fainting [Confusion] : no confusion [Unsteady Walk] : no ataxia [Memory Loss] : memory loss [Anxiety] : anxiety [Negative] : Musculoskeletal

## 2022-08-02 ENCOUNTER — NON-APPOINTMENT (OUTPATIENT)
Age: 57
End: 2022-08-02

## 2022-08-02 LAB
A PHAGOCYTOPH IGG TITR SER IF: NORMAL TITER
B BURGDOR AB SER QL IA: NEGATIVE
B MICROTI IGG TITR SER: NORMAL TITER
E CHAFFEENSIS IGG TITR SER IF: NORMAL TITER
R RICKETTSI IGG CSF-ACNC: NEGATIVE
R RICKETTSI IGM CSF-ACNC: 1.12 INDEX

## 2022-09-09 ENCOUNTER — APPOINTMENT (OUTPATIENT)
Dept: FAMILY MEDICINE | Facility: CLINIC | Age: 57
End: 2022-09-09

## 2022-09-09 VITALS
HEART RATE: 81 BPM | WEIGHT: 172 LBS | TEMPERATURE: 98.2 F | HEIGHT: 67 IN | BODY MASS INDEX: 27 KG/M2 | DIASTOLIC BLOOD PRESSURE: 70 MMHG | SYSTOLIC BLOOD PRESSURE: 108 MMHG | OXYGEN SATURATION: 99 %

## 2022-09-09 DIAGNOSIS — R21 RASH AND OTHER NONSPECIFIC SKIN ERUPTION: ICD-10-CM

## 2022-09-09 DIAGNOSIS — R42 DIZZINESS AND GIDDINESS: ICD-10-CM
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
## 2022-09-09 DIAGNOSIS — M25.50 PAIN IN UNSPECIFIED JOINT: ICD-10-CM

## 2022-09-09 PROCEDURE — 36415 COLL VENOUS BLD VENIPUNCTURE: CPT

## 2022-09-09 PROCEDURE — 99214 OFFICE O/P EST MOD 30 MIN: CPT | Mod: 25

## 2022-09-09 RX ORDER — AMOXICILLIN 500 MG/1
500 CAPSULE ORAL TWICE DAILY
Qty: 20 | Refills: 1 | Status: DISCONTINUED | COMMUNITY
Start: 2022-07-28 | End: 2022-09-09

## 2022-09-09 NOTE — REVIEW OF SYSTEMS
[Dizziness] : dizziness [Memory Loss] : memory loss [Anxiety] : anxiety [Negative] : Musculoskeletal [Headache] : no headache [Fainting] : no fainting [Confusion] : no confusion [Unsteady Walk] : no ataxia

## 2022-09-13 LAB
R RICKETTSI IGG CSF-ACNC: NEGATIVE
R RICKETTSI IGM CSF-ACNC: 0.76 INDEX

## 2022-09-14 LAB
A PHAGOCYTOPH IGG TITR SER IF: NORMAL TITER
B BURGDOR AB SER QL IA: NEGATIVE
B MICROTI IGG TITR SER: NORMAL TITER
E CHAFFEENSIS IGG TITR SER IF: NORMAL TITER

## 2022-12-28 ENCOUNTER — LABORATORY RESULT (OUTPATIENT)
Age: 57
End: 2022-12-28

## 2022-12-28 ENCOUNTER — APPOINTMENT (OUTPATIENT)
Dept: INTERNAL MEDICINE | Facility: CLINIC | Age: 57
End: 2022-12-28

## 2022-12-28 VITALS
TEMPERATURE: 98.1 F | HEART RATE: 69 BPM | OXYGEN SATURATION: 96 % | WEIGHT: 172 LBS | SYSTOLIC BLOOD PRESSURE: 124 MMHG | BODY MASS INDEX: 26.94 KG/M2 | DIASTOLIC BLOOD PRESSURE: 76 MMHG

## 2022-12-28 DIAGNOSIS — N20.0 CALCULUS OF KIDNEY: ICD-10-CM

## 2022-12-28 PROCEDURE — 99214 OFFICE O/P EST MOD 30 MIN: CPT

## 2022-12-28 NOTE — PLAN
[FreeTextEntry1] : Drink plenty of PO liquids,\par \par Take azo , OTC its only symptomatic management, not treatment. As the s/s improve the Azo can be discontinued.\par Tylenol for the fever as needed\par Advil on a full stomach for back pain or general body aches.If the back pain is persistent or fever is persistent please call the office.can substitute tylenol for advil\par Life style modification after the coitus suggested, to increase the general hygiene\par Maintain a dairy of UTIs, if frequent incidence of UTIs, may consider the antibiotic prophylaxis\par \par Patient's BMI was documented and addressed with the patient.  Weight loss plan discussed with the patient in full.  \par  nutrition support provided nutritionist name offered to the patient.\par Exercise and weight reduction risk reduction, explained to the patient.\par \par

## 2022-12-29 ENCOUNTER — TRANSCRIPTION ENCOUNTER (OUTPATIENT)
Age: 57
End: 2022-12-29

## 2022-12-29 LAB
APPEARANCE: ABNORMAL
BILIRUBIN URINE: NEGATIVE
BLOOD URINE: ABNORMAL
COLOR: YELLOW
GLUCOSE QUALITATIVE U: ABNORMAL
KETONES URINE: NEGATIVE
LEUKOCYTE ESTERASE URINE: ABNORMAL
NITRITE URINE: POSITIVE
PH URINE: 6
PROTEIN URINE: ABNORMAL
SPECIFIC GRAVITY URINE: 1.02
UROBILINOGEN URINE: NORMAL

## 2022-12-30 ENCOUNTER — APPOINTMENT (OUTPATIENT)
Dept: FAMILY MEDICINE | Facility: CLINIC | Age: 57
End: 2022-12-30
Payer: COMMERCIAL

## 2022-12-30 ENCOUNTER — TRANSCRIPTION ENCOUNTER (OUTPATIENT)
Age: 57
End: 2022-12-30

## 2022-12-30 VITALS
DIASTOLIC BLOOD PRESSURE: 80 MMHG | OXYGEN SATURATION: 96 % | SYSTOLIC BLOOD PRESSURE: 120 MMHG | WEIGHT: 166 LBS | HEART RATE: 81 BPM | TEMPERATURE: 98.7 F | BODY MASS INDEX: 26 KG/M2

## 2022-12-30 DIAGNOSIS — R31.9 HEMATURIA, UNSPECIFIED: ICD-10-CM

## 2022-12-30 DIAGNOSIS — N39.0 URINARY TRACT INFECTION, SITE NOT SPECIFIED: ICD-10-CM

## 2022-12-30 LAB
BILIRUB UR QL STRIP: NEGATIVE
GLUCOSE UR-MCNC: 100
HCG UR QL: 1 EU/DL
HGB UR QL STRIP.AUTO: NORMAL
KETONES UR-MCNC: NORMAL
LEUKOCYTE ESTERASE UR QL STRIP: NEGATIVE
NITRITE UR QL STRIP: POSITIVE
PH UR STRIP: 5
PROT UR STRIP-MCNC: 300
SP GR UR STRIP: 1.02

## 2022-12-30 PROCEDURE — 99214 OFFICE O/P EST MOD 30 MIN: CPT | Mod: 25

## 2022-12-30 PROCEDURE — 81003 URINALYSIS AUTO W/O SCOPE: CPT | Mod: QW

## 2023-01-03 NOTE — PHYSICAL EXAM
[Normal] : normal rate, regular rhythm, normal S1 and S2 and no murmur heard [No CVA Tenderness] : no CVA  tenderness

## 2023-01-04 ENCOUNTER — TRANSCRIPTION ENCOUNTER (OUTPATIENT)
Age: 58
End: 2023-01-04

## 2023-01-09 ENCOUNTER — APPOINTMENT (OUTPATIENT)
Dept: UROLOGY | Facility: CLINIC | Age: 58
End: 2023-01-09

## 2023-07-20 ENCOUNTER — APPOINTMENT (OUTPATIENT)
Dept: FAMILY MEDICINE | Facility: CLINIC | Age: 58
End: 2023-07-20
Payer: COMMERCIAL

## 2023-07-20 VITALS
DIASTOLIC BLOOD PRESSURE: 70 MMHG | WEIGHT: 163 LBS | HEART RATE: 86 BPM | HEIGHT: 67 IN | TEMPERATURE: 97.3 F | SYSTOLIC BLOOD PRESSURE: 114 MMHG | BODY MASS INDEX: 25.58 KG/M2 | OXYGEN SATURATION: 96 %

## 2023-07-20 DIAGNOSIS — R39.9 UNSPECIFIED SYMPTOMS AND SIGNS INVOLVING THE GENITOURINARY SYSTEM: ICD-10-CM

## 2023-07-20 DIAGNOSIS — E11.9 TYPE 2 DIABETES MELLITUS W/OUT COMPLICATIONS: ICD-10-CM

## 2023-07-20 DIAGNOSIS — E78.5 HYPERLIPIDEMIA, UNSPECIFIED: ICD-10-CM

## 2023-07-20 LAB
BILIRUB UR QL STRIP: NEGATIVE
GLUCOSE UR-MCNC: NEGATIVE
HCG UR QL: 0.2 EU/DL
HGB UR QL STRIP.AUTO: NEGATIVE
KETONES UR-MCNC: NEGATIVE
LEUKOCYTE ESTERASE UR QL STRIP: NEGATIVE
NITRITE UR QL STRIP: NEGATIVE
PH UR STRIP: 5.5
PROT UR STRIP-MCNC: NEGATIVE
SP GR UR STRIP: 1.02

## 2023-07-20 PROCEDURE — 81003 URINALYSIS AUTO W/O SCOPE: CPT | Mod: QW

## 2023-07-20 PROCEDURE — 99213 OFFICE O/P EST LOW 20 MIN: CPT | Mod: 25

## 2023-07-20 RX ORDER — SULFAMETHOXAZOLE AND TRIMETHOPRIM 800; 160 MG/1; MG/1
800-160 TABLET ORAL TWICE DAILY
Qty: 14 | Refills: 0 | Status: DISCONTINUED | COMMUNITY
Start: 2022-12-29 | End: 2023-07-20

## 2023-07-20 RX ORDER — CIPROFLOXACIN HYDROCHLORIDE 500 MG/1
500 TABLET, FILM COATED ORAL
Qty: 21 | Refills: 1 | Status: DISCONTINUED | COMMUNITY
Start: 2022-12-30 | End: 2023-07-20

## 2023-07-22 PROBLEM — R39.9 LOWER URINARY TRACT SYMPTOMS (LUTS): Status: ACTIVE | Noted: 2021-03-11

## 2023-07-22 PROBLEM — E11.9 T2DM (TYPE 2 DIABETES MELLITUS): Status: ACTIVE | Noted: 2017-08-28

## 2023-07-22 PROBLEM — E78.5 HYPERLIPIDEMIA LDL GOAL <100: Status: ACTIVE | Noted: 2018-08-29

## 2023-07-22 NOTE — HISTORY OF PRESENT ILLNESS
[FreeTextEntry1] : follow up [de-identified] :  Being followed by cardiology for ? thrombus\par  Re check urine\par

## 2023-07-22 NOTE — PHYSICAL EXAM
[Normal] : normal rate, regular rhythm, normal S1 and S2 and no murmur heard [No Carotid Bruits] : no carotid bruits [Alert and Oriented x3] : oriented to person, place, and time

## 2024-01-21 ENCOUNTER — RX RENEWAL (OUTPATIENT)
Age: 59
End: 2024-01-21

## 2024-01-21 RX ORDER — OMEPRAZOLE 20 MG/1
20 CAPSULE, DELAYED RELEASE ORAL
Qty: 180 | Refills: 0 | Status: ACTIVE | COMMUNITY
Start: 2018-09-06 | End: 1900-01-01

## 2024-07-02 ENCOUNTER — APPOINTMENT (OUTPATIENT)
Dept: PHYSICAL MEDICINE AND REHAB | Facility: CLINIC | Age: 59
End: 2024-07-02
Payer: COMMERCIAL

## 2024-07-02 VITALS
HEIGHT: 67 IN | SYSTOLIC BLOOD PRESSURE: 120 MMHG | WEIGHT: 153 LBS | BODY MASS INDEX: 24.01 KG/M2 | DIASTOLIC BLOOD PRESSURE: 80 MMHG

## 2024-07-02 DIAGNOSIS — M47.816 SPONDYLOSIS W/OUT MYELOPATHY OR RADICULOPATHY, LUMBAR REGION: ICD-10-CM

## 2024-07-02 DIAGNOSIS — M62.830 MUSCLE SPASM OF BACK: ICD-10-CM

## 2024-07-02 DIAGNOSIS — M51.9 UNSPECIFIED THORACIC, THORACOLUMBAR AND LUMBOSACRAL INTERVERTEBRAL DISC DISORDER: ICD-10-CM

## 2024-07-02 PROCEDURE — 99205 OFFICE O/P NEW HI 60 MIN: CPT

## 2024-07-02 RX ORDER — METHOCARBAMOL 500 MG/1
500 TABLET, FILM COATED ORAL 3 TIMES DAILY
Qty: 30 | Refills: 0 | Status: ACTIVE | COMMUNITY
Start: 2024-07-02 | End: 1900-01-01

## 2024-07-08 ENCOUNTER — RESULT REVIEW (OUTPATIENT)
Age: 59
End: 2024-07-08

## 2024-08-12 ENCOUNTER — APPOINTMENT (OUTPATIENT)
Dept: PHYSICAL MEDICINE AND REHAB | Facility: CLINIC | Age: 59
End: 2024-08-12
Payer: COMMERCIAL

## 2024-08-12 DIAGNOSIS — M79.18 MYALGIA, OTHER SITE: ICD-10-CM

## 2024-08-12 PROCEDURE — 99213 OFFICE O/P EST LOW 20 MIN: CPT

## 2024-08-12 NOTE — END OF VISIT
[FreeTextEntry3] :  The following information has been documented by Ashley Roberto acting as a scribe. The documentation recorded by the scribe, in my presence, accurately reflects the service I, Dr. Kothari, personally performed, and the decisions made by me with my edits as appropriate.

## 2024-08-12 NOTE — HISTORY OF PRESENT ILLNESS
[FreeTextEntry1] : Patient reports no significant improvement with physical therapy and muscle relaxant.  Continues to complain of low back pain with greater involvement on the left radiating into the left gluteal musculature with occasional paresthesia involving left thigh.  Presents today for reevaluation.  X-ray results of the lumbar spine reviewed with the patient

## 2024-08-12 NOTE — ASSESSMENT
[FreeTextEntry1] : MRI L/S  Home exercise plan reviewed with patient. Stressed the importance for the need for frequent change in position from sit to stand and stand to sit, as well as use of weight shifting techniques to alleviate low back discomfort. Instruction in proper posturing, body mechanics and lifting techniques.  Initiate trial of TPI therapy to address myofascial pain component of his low back complaints. Recheck in 4 to 6 weeks if symptoms   At least 20 minutes was spent with patient face to face examining patient, reviewing findings/results, counseling patient and coordinating treatment program. Ample time was provided to answer any questions or address concerns to the patient's satisfaction.

## 2024-08-12 NOTE — PHYSICAL EXAM
[FreeTextEntry1] : EXAMINATION OF LUMBAR SPINE & LOWER EXTREMITIES:   Reflexes (R) L/E:                   Quadriceps 2+                   Achilles 2+ Reflexes (L) L/E:                    Quadriceps 2+                    Achilles 2+   Sensory L/E: decrease sensation involving the left L5 dermatome distribution.      Good peripheral Pulses Bilateral L/E    Testing:  SI Jt Lig.Challenege Test (R) -  SI Jt Lig.Challenege Test (L) + S.L.R. ( R ) -60 degrees with hamstring tightness S.L.R. ( L )+50 with hamstring tightness  Range of motion testing was performed with the use of a goniometer.                            Flexion: 60 degrees (normal - 75-90)                           Extension: 15 degrees (normal - 30)                           Lateral Bending ( R ): 30 degrees  (normal - 35)                           Lateral Bending ( L ): 35 degrees (normal - 35)                           Thoracic Rotation ( R ):35 degrees (normal - 35)                           Thoracic Rotation ( L ): 30 degrees (normal - 35)  MMT: intact  Palpation of the Lumbar Spine: tenderness involving the L4/L5 L5/S1 interspace. Tenderness and spasm involving the bilateral lower lumbar paraspinal musculature. Trigger points involving the bilateral gluteal musculature with greater involvement on the left.

## 2024-08-13 ENCOUNTER — RESULT REVIEW (OUTPATIENT)
Age: 59
End: 2024-08-13

## 2024-08-22 ENCOUNTER — APPOINTMENT (OUTPATIENT)
Dept: PHYSICAL MEDICINE AND REHAB | Facility: CLINIC | Age: 59
End: 2024-08-22
Payer: COMMERCIAL

## 2024-08-22 DIAGNOSIS — M62.830 MUSCLE SPASM OF BACK: ICD-10-CM

## 2024-08-22 DIAGNOSIS — M51.9 UNSPECIFIED THORACIC, THORACOLUMBAR AND LUMBOSACRAL INTERVERTEBRAL DISC DISORDER: ICD-10-CM

## 2024-08-22 PROBLEM — M54.16 LUMBAR RADICULITIS: Status: ACTIVE | Noted: 2024-08-12

## 2024-08-22 PROBLEM — M47.816 DJD (DEGENERATIVE JOINT DISEASE), LUMBAR: Status: ACTIVE | Noted: 2024-08-22

## 2024-08-22 PROCEDURE — 99213 OFFICE O/P EST LOW 20 MIN: CPT | Mod: 25

## 2024-08-22 PROCEDURE — 20553 NJX 1/MLT TRIGGER POINTS 3/>: CPT

## 2024-08-22 NOTE — HISTORY OF PRESENT ILLNESS
[FreeTextEntry1] : Patient reports some improvement with low back pain however he continues to complain of low back pain primarily left-sided denies radicular symptoms involving lower extremities.  Reports low back pain is aggravated with prolonged sitting standing and ambulation.  MRI results of lumbar spine reviewed with the patient   Examination of the Lumbar Spine:   Flexion: 55 degrees (normal -75-90  )  Extension: 20 degrees (normal - 30)  Lateral Bending ( R ): 30 degrees  (normal - 35) Lateral Bending ( L ): 30 degrees (normal - 35) Thoracic Rotation ( R ):  35 degrees (normal - 35) Thoracic Rotation ( L ):  30  degrees (normal - 35)     MMT: intact  Sensory L/E: Decree sensation left L5 dermatome SI Jt Lig.Challenege Test (R) negative SI Jt Lig.Challenege Test (L) positive S.L.R. ( R ) negative to 70 degrees S.L.R. ( L ) positive at 50 degrees   Palpation: Isolated trigger points identified involving the left gluteus gwendolyn, left gluteus medius, and left piriformis musculature     After today's examination additional trigger points were identified involving the left gluteus gwendolyn, left gluteus medius, and left piriformis musculature, thus indicating the need for additional trigger point therapy.  Goals of which are to decrease pain, dissipate muscle spasm, increase ROM and improve level of function.     Sterile Technique Injection 2 Syringes of 5 cc 1 % Lidocaine HCL  left gluteus gwendolyn, left gluteus medius, and left piriformis musculature     Ice injection site PRN   Injection tolerated well.     Multiple areas were identified using palpation guidance. Using aseptic technique, each of these areas left gluteus gwendolyn, left gluteus medius, and left piriformis musculature were isolated and the skin prepped with alcohol.  A 22 gauge 1 1/2 inch needle was inserted to the level of the muscle. At this point, a slight twitch was elicited and in some cases the patient identified referred pain to areas distant from the injection site.  All of these were consistent with the definition of a trigger point.   Aspiration revealed no blood and a mixture of 5cc 1 % Lidocaine HCL was injected in increments of 3-4mL into each of these areas for a total of 3 sites. The needle was removed. Hemostasis was achieved with direct pressure.  The patient tolerated the procedure well. Post-procedure exam did not reveal any new neurological deficits. The patient was instructed to call with fever, chills, increased pain, redness or swelling at the injection site, or numbness or weakness in the lower extremities. The patient was discharged home in good condition with post-procedural instructions.    At least 20 minutes was spent with patient face to face examining patient, reviewing findings/results, counseling patient and coordinating treatment program. Ample time was provided to answer any questions or address concerns to the patient's satisfaction.   Recheck 1- 2 weeks to assess clinical response to TPI therapy and need for additional treatment.

## 2024-08-26 ENCOUNTER — APPOINTMENT (OUTPATIENT)
Dept: PHYSICAL MEDICINE AND REHAB | Facility: CLINIC | Age: 59
End: 2024-08-26
Payer: COMMERCIAL

## 2024-08-26 PROCEDURE — 99213 OFFICE O/P EST LOW 20 MIN: CPT | Mod: 25

## 2024-08-26 PROCEDURE — 20553 NJX 1/MLT TRIGGER POINTS 3/>: CPT

## 2024-08-26 NOTE — PHYSICAL EXAM
[FreeTextEntry1] : Examination of the Lumbar Spine:   Flexion: 50 degrees (normal -75-90  )  Extension: 20 degrees (normal - 30)  Lateral Bending ( R ): 30 degrees  (normal - 35) Lateral Bending ( L ): 25 degrees (normal - 35) Thoracic Rotation ( R ):  35 degrees (normal - 35) Thoracic Rotation ( L ):  30  degrees (normal - 35)     MMT: intact  Sensory L/E: Decree sensation left L5 dermatome SI Jt Lig.Challenege Test (R) negative SI Jt Lig.Challenege Test (L) positive S.L.R. ( R ) negative to 70 degrees S.L.R. ( L ) positive at 50 degrees   Palpation: Isolated trigger points identified involving the left gluteus gwendolyn, left gluteus medius, and left piriformis musculature     After today's examination additional trigger points were identified involving the left gluteus gwendolyn, left gluteus medius, and left piriformis musculature, thus indicating the need for additional trigger point therapy.  Goals of which are to decrease pain, dissipate muscle spasm, increase ROM and improve level of function.     Sterile Technique Injection 2 Syringes of 5 cc 1 % Lidocaine HCL  left gluteus gwendolyn, left gluteus medius, and left piriformis musculature     Ice injection site PRN   Injection tolerated well.     Multiple areas were identified using palpation guidance. Using aseptic technique, each of these areas left gluteus gwendolyn, left gluteus medius, and left piriformis musculature were isolated and the skin prepped with alcohol.  A 22 gauge 1 1/2 inch needle was inserted to the level of the muscle. At this point, a slight twitch was elicited and in some cases the patient identified referred pain to areas distant from the injection site.  All of these were consistent with the definition of a trigger point.   Aspiration revealed no blood and a mixture of 5cc 1 % Lidocaine HCL was injected in increments of 3-4mL into each of these areas for a total of 3 sites. The needle was removed. Hemostasis was achieved with direct pressure.  The patient tolerated the procedure well. Post-procedure exam did not reveal any new neurological deficits. The patient was instructed to call with fever, chills, increased pain, redness or swelling at the injection site, or numbness or weakness in the lower extremities. The patient was discharged home in good condition with post-procedural instructions.    At least 20 minutes was spent with patient face to face examining patient, reviewing findings/results, counseling patient and coordinating treatment program. Ample time was provided to answer any questions or address concerns to the patient's satisfaction.   Recheck 1- 2 weeks to assess clinical response to TPI therapy and need for additional treatment.

## 2024-08-26 NOTE — HISTORY OF PRESENT ILLNESS
[FreeTextEntry1] : Patient reports some improvement with low back pain however he continues to complain of low back pain primarily left-sided denies radicular symptoms involving lower extremities.  Reports low back pain is aggravated with prolonged sitting standing and ambulation.  MRI results of lumbar spine reviewed with the patient

## 2024-09-05 ENCOUNTER — APPOINTMENT (OUTPATIENT)
Dept: PHYSICAL MEDICINE AND REHAB | Facility: CLINIC | Age: 59
End: 2024-09-05
Payer: COMMERCIAL

## 2024-09-05 DIAGNOSIS — M51.9 UNSPECIFIED THORACIC, THORACOLUMBAR AND LUMBOSACRAL INTERVERTEBRAL DISC DISORDER: ICD-10-CM

## 2024-09-05 PROCEDURE — 20553 NJX 1/MLT TRIGGER POINTS 3/>: CPT

## 2024-09-05 PROCEDURE — 99213 OFFICE O/P EST LOW 20 MIN: CPT | Mod: 25

## 2024-09-05 NOTE — HISTORY OF PRESENT ILLNESS
[FreeTextEntry1] : Patient reports some improvement following last session of TPI therapy.  Reports decreased pain and dissipation of muscle spasm.  Notes he is able to sit for longer periods of time without difficulty.  Continues to complain of bilateral gluteal discomfort with prolonged sitting and ambulation presents today for reevaluation.

## 2024-09-05 NOTE — PHYSICAL EXAM
[FreeTextEntry1] : Examination of the Lumbar Spine:   Flexion: 55 degrees (normal -75-90  )  Extension: 25 degrees (normal - 30)  Lateral Bending ( R ): 30 degrees  (normal - 35) Lateral Bending ( L ): 30 degrees (normal - 35) Thoracic Rotation ( R ):  35 degrees (normal - 35) Thoracic Rotation ( L ):  30  degrees (normal - 35)     MMT: intact  Sensory L/E: Decree sensation left L5 dermatome SI Jt Lig.Challenege Test (R) negative SI Jt Lig.Challenege Test (L) positive S.L.R. ( R ) negative to 70 degrees S.L.R. ( L ) negative at 70 degrees Palpation: Isolated trigger points identified involving the bilateral gluteus gwendolyn and medius musculature    After today's examination additional trigger points were identified involving the left gluteus gwendolyn, left gluteus medius, and left piriformis musculature, thus indicating the need for additional trigger point therapy.  Goals of which are to decrease pain, dissipate muscle spasm, increase ROM and improve level of function.     Sterile Technique Injection 2 Syringes of 5 cc 1 % Lidocaine HCL  bilateral gluteus gwendolyn and medius musculature     Ice injection site PRN   Injection tolerated well.     Multiple areas were identified using palpation guidance. Using aseptic technique, each of these areas bilateral gluteus gwendolyn and medius musculature were isolated and the skin prepped with alcohol.  A 22 gauge 1 1/2 inch needle was inserted to the level of the muscle. At this point, a slight twitch was elicited and in some cases the patient identified referred pain to areas distant from the injection site.  All of these were consistent with the definition of a trigger point.   Aspiration revealed no blood and a mixture of 5cc 1 % Lidocaine HCL was injected in increments of 3-4mL into each of these areas for a total of 4 sites. The needle was removed. Hemostasis was achieved with direct pressure.  The patient tolerated the procedure well. Post-procedure exam did not reveal any new neurological deficits. The patient was instructed to call with fever, chills, increased pain, redness or swelling at the injection site, or numbness or weakness in the lower extremities. The patient was discharged home in good condition with post-procedural instructions.    At least 20 minutes was spent with patient face to face examining patient, reviewing findings/results, counseling patient and coordinating treatment program. Ample time was provided to answer any questions or address concerns to the patient's satisfaction.   Recheck 1- 2 weeks to assess clinical response to TPI therapy and need for additional treatment.

## 2024-09-10 ENCOUNTER — APPOINTMENT (OUTPATIENT)
Dept: PHYSICAL MEDICINE AND REHAB | Facility: CLINIC | Age: 59
End: 2024-09-10
Payer: COMMERCIAL

## 2024-09-10 PROCEDURE — 99213 OFFICE O/P EST LOW 20 MIN: CPT | Mod: 25

## 2024-09-10 PROCEDURE — 20553 NJX 1/MLT TRIGGER POINTS 3/>: CPT

## 2024-09-10 NOTE — PHYSICAL EXAM
[FreeTextEntry1] : Examination of the Lumbar Spine:   Flexion: 60 degrees (normal -75-90  )  Extension: 25 degrees (normal - 30)  Lateral Bending ( R ): 35 degrees  (normal - 35) Lateral Bending ( L ): 30 degrees (normal - 35) Thoracic Rotation ( R ):  35 degrees (normal - 35) Thoracic Rotation ( L ):  30  degrees (normal - 35)     MMT: intact  Sensory L/E: Decree sensation left L5 dermatome SI Jt Lig.Challenege Test (R) negative SI Jt Lig.Challenege Test (L) positive S.L.R. ( R ) negative to 70 degrees S.L.R. ( L ) negative at 70 degrees Palpation: Isolated trigger points identified involving the bilateral gluteus gwendolyn     After today's examination additional trigger points were identified involving the bilateral gluteus gwendolyn , thus indicating the need for additional trigger point therapy.  Goals of which are to decrease pain, dissipate muscle spasm, increase ROM and improve level of function.     Sterile Technique Injection 2 Syringes of 5 cc 1 % Lidocaine HCL bilateral gluteus gwendolyn      Ice injection site PRN   Injection tolerated well.     Multiple areas were identified using palpation guidance. Using aseptic technique, each of these areas bilateral gluteus gwendolyn were isolated and the skin prepped with alcohol.  A 22 gauge 1 1/2 inch needle was inserted to the level of the muscle. At this point, a slight twitch was elicited and in some cases the patient identified referred pain to areas distant from the injection site.  All of these were consistent with the definition of a trigger point.   Aspiration revealed no blood and a mixture of 5cc 1 % Lidocaine HCL was injected in increments of 3-4mL into each of these areas for a total of 2 sites. The needle was removed. Hemostasis was achieved with direct pressure.  The patient tolerated the procedure well. Post-procedure exam did not reveal any new neurological deficits. The patient was instructed to call with fever, chills, increased pain, redness or swelling at the injection site, or numbness or weakness in the lower extremities. The patient was discharged home in good condition with post-procedural instructions.    At least 20 minutes was spent with patient face to face examining patient, reviewing findings/results, counseling patient and coordinating treatment program. Ample time was provided to answer any questions or address concerns to the patient's satisfaction.   Recheck 1- 2 weeks to assess clinical response to TPI therapy and need for additional treatment.

## 2024-09-10 NOTE — HISTORY OF PRESENT ILLNESS
[FreeTextEntry1] : Patient reports he continues to find TPI therapy beneficial.  Reports decreasing low back pain improved range of motion and diminished muscle spasm.  He reports he has been able to increase his level of physical activity.  He is able to stand and sit and ambulate for longer periods of time with less difficulty.  He is complaining of mild discomfort involving the bilateral gluteal musculature.  Presents today for reevaluation

## 2024-09-19 ENCOUNTER — APPOINTMENT (OUTPATIENT)
Dept: PHYSICAL MEDICINE AND REHAB | Facility: CLINIC | Age: 59
End: 2024-09-19
Payer: COMMERCIAL

## 2024-09-19 DIAGNOSIS — M62.830 MUSCLE SPASM OF BACK: ICD-10-CM

## 2024-09-19 DIAGNOSIS — M47.816 SPONDYLOSIS W/OUT MYELOPATHY OR RADICULOPATHY, LUMBAR REGION: ICD-10-CM

## 2024-09-19 DIAGNOSIS — M79.18 MYALGIA, OTHER SITE: ICD-10-CM

## 2024-09-19 DIAGNOSIS — M54.16 RADICULOPATHY, LUMBAR REGION: ICD-10-CM

## 2024-09-19 PROCEDURE — 20553 NJX 1/MLT TRIGGER POINTS 3/>: CPT

## 2024-09-19 PROCEDURE — 99213 OFFICE O/P EST LOW 20 MIN: CPT | Mod: 25

## 2024-09-19 NOTE — DATA REVIEWED
[MRI] : MRI [FreeTextEntry1] : MRI LS 08/13/24 Dhruv [facility]ks[facility] Cantharidin Pregnancy And Lactation Text: This medication has not been proven safe during pregnancy. It is unknown if this medication is excreted in breast milk.

## 2024-09-19 NOTE — HISTORY OF PRESENT ILLNESS
[FreeTextEntry1] : Patient reports continued improvement with TPI therapy as a relates to his low back pain.  Overall he is doing well.  He reports occasional discomfort involving right gluteal musculature.  Denies radicular symptoms involving lower extremities.  Presents today for reevaluation

## 2024-09-19 NOTE — PHYSICAL EXAM
[FreeTextEntry1] : Examination of the Lumbar Spine:   Flexion: 65 degrees (normal -75-90  )  Extension: 25 degrees (normal - 30)  Lateral Bending ( R ): 35 degrees  (normal - 35) Lateral Bending ( L ): 35 degrees (normal - 35) Thoracic Rotation ( R ):  35 degrees (normal - 35) Thoracic Rotation ( L ):  30  degrees (normal - 35)     MMT: intact  Sensory L/E: Decree sensation left L5 dermatome SI Jt Lig.Challenege Test (R) negative SI Jt Lig.Challenege Test (L) negative S.L.R. ( R ) negative to 70 degrees S.L.R. ( L ) negative at 70 degrees Palpation: Isolated trigger points identified involving the right gluteus gwendolyn and gluteus medius musculature    After today's examination additional trigger points were identified involving the right gluteus gwendolyn and gluteus medius musculature, thus indicating the need for additional trigger point therapy.  Goals of which are to decrease pain, dissipate muscle spasm, increase ROM and improve level of function.     Sterile Technique Injection 2 Syringes of 5 cc 1 % Lidocaine HCL right gluteus gwendolyn and gluteus medius musculature     Ice injection site PRN   Injection tolerated well.     Multiple areas were identified using palpation guidance. Using aseptic technique, each of these areas right gluteus gwendolyn and gluteus medius musculature were isolated and the skin prepped with alcohol.  A 22 gauge 1 1/2 inch needle was inserted to the level of the muscle. At this point, a slight twitch was elicited and in some cases the patient identified referred pain to areas distant from the injection site.  All of these were consistent with the definition of a trigger point.   Aspiration revealed no blood and a mixture of 5cc 1 % Lidocaine HCL was injected in increments of 3-4 mL into each of these areas for a total of 2 sites. The needle was removed. Hemostasis was achieved with direct pressure.  The patient tolerated the procedure well. Post-procedure exam did not reveal any new neurological deficits. The patient was instructed to call with fever, chills, increased pain, redness or swelling at the injection site, or numbness or weakness in the lower extremities. The patient was discharged home in good condition with post-procedural instructions.    At least 20 minutes was spent with patient face to face examining patient, reviewing findings/results, counseling patient and coordinating treatment program. Ample time was provided to answer any questions or address concerns to the patient's satisfaction.   Recheck 1- 2 weeks to assess clinical response to TPI therapy and need for additional treatment.

## 2024-09-23 ENCOUNTER — APPOINTMENT (OUTPATIENT)
Dept: PHYSICAL MEDICINE AND REHAB | Facility: CLINIC | Age: 59
End: 2024-09-23

## 2024-09-30 ENCOUNTER — APPOINTMENT (OUTPATIENT)
Dept: PHYSICAL MEDICINE AND REHAB | Facility: CLINIC | Age: 59
End: 2024-09-30
Payer: COMMERCIAL

## 2024-09-30 DIAGNOSIS — M54.16 RADICULOPATHY, LUMBAR REGION: ICD-10-CM

## 2024-09-30 DIAGNOSIS — M62.830 MUSCLE SPASM OF BACK: ICD-10-CM

## 2024-09-30 DIAGNOSIS — M51.9 UNSPECIFIED THORACIC, THORACOLUMBAR AND LUMBOSACRAL INTERVERTEBRAL DISC DISORDER: ICD-10-CM

## 2024-09-30 DIAGNOSIS — M47.816 SPONDYLOSIS W/OUT MYELOPATHY OR RADICULOPATHY, LUMBAR REGION: ICD-10-CM

## 2024-09-30 PROCEDURE — 20553 NJX 1/MLT TRIGGER POINTS 3/>: CPT

## 2024-09-30 PROCEDURE — 99213 OFFICE O/P EST LOW 20 MIN: CPT | Mod: 25

## 2024-09-30 NOTE — PHYSICAL EXAM
[FreeTextEntry1] : Examination of the Lumbar Spine:   Flexion: 60 degrees (normal -75-90  )  Extension: 25 degrees (normal - 30)  Lateral Bending ( R ): 35 degrees  (normal - 35) Lateral Bending ( L ): 35 degrees (normal - 35) Thoracic Rotation ( R ):  35 degrees (normal - 35) Thoracic Rotation ( L ):  30  degrees (normal - 35)     MMT: intact  Sensory L/E: Decree sensation left L5 dermatome SI Jt Lig.Challenege Test (R) negative SI Jt Lig.Challenege Test (L) negative S.L.R. ( R ) negative to 70 degrees S.L.R. ( L ) negative at 70 degrees Palpation: Isolated trigger points identified involving the right gluteus gwendolyn and gluteus medius musculature    After today's examination additional trigger points were identified involving the right gluteus gwendolyn and gluteus medius musculature, thus indicating the need for additional trigger point therapy.  Goals of which are to decrease pain, dissipate muscle spasm, increase ROM and improve level of function.     Sterile Technique Injection 2 Syringes of 5 cc 1 % Lidocaine HCL right gluteus gwendolyn and gluteus medius musculature     Ice injection site PRN   Injection tolerated well.     Multiple areas were identified using palpation guidance. Using aseptic technique, each of these areas right gluteus gwendolyn and gluteus medius musculature were isolated and the skin prepped with alcohol.  A 22 gauge 1 1/2 inch needle was inserted to the level of the muscle. At this point, a slight twitch was elicited and in some cases the patient identified referred pain to areas distant from the injection site.  All of these were consistent with the definition of a trigger point.   Aspiration revealed no blood and a mixture of 5cc 1 % Lidocaine HCL was injected in increments of 3-4 mL into each of these areas for a total of 2 sites. The needle was removed. Hemostasis was achieved with direct pressure.  The patient tolerated the procedure well. Post-procedure exam did not reveal any new neurological deficits. The patient was instructed to call with fever, chills, increased pain, redness or swelling at the injection site, or numbness or weakness in the lower extremities. The patient was discharged home in good condition with post-procedural instructions.    At least 20 minutes was spent with patient face to face examining patient, reviewing findings/results, counseling patient and coordinating treatment program. Ample time was provided to answer any questions or address concerns to the patient's satisfaction.   Recheck 1- 2 weeks to assess clinical response to TPI therapy and need for additional treatment.

## 2024-11-05 ENCOUNTER — APPOINTMENT (OUTPATIENT)
Dept: PHYSICAL MEDICINE AND REHAB | Facility: CLINIC | Age: 59
End: 2024-11-05

## 2025-01-06 ENCOUNTER — OFFICE (OUTPATIENT)
Dept: URBAN - METROPOLITAN AREA CLINIC 100 | Facility: CLINIC | Age: 60
Setting detail: OPHTHALMOLOGY
End: 2025-01-06
Payer: COMMERCIAL

## 2025-01-06 DIAGNOSIS — H43.393: ICD-10-CM

## 2025-01-06 DIAGNOSIS — H35.413: ICD-10-CM

## 2025-01-06 DIAGNOSIS — H11.153: ICD-10-CM

## 2025-01-06 DIAGNOSIS — E11.9: ICD-10-CM

## 2025-01-06 PROCEDURE — 92014 COMPRE OPH EXAM EST PT 1/>: CPT | Performed by: OPHTHALMOLOGY

## 2025-01-06 ASSESSMENT — REFRACTION_CURRENTRX
OD_CYLINDER: -0.75
OD_AXIS: 090
OD_ADD: +2.25
OS_CYLINDER: -0.25
OD_SPHERE: +2.00
OD_ADD: +2.50
OD_VPRISM_DIRECTION: PROGS
OS_OVR_VA: 20/
OS_AXIS: 051
OS_AXIS: 072
OD_VPRISM_DIRECTION: PROGS
OS_OVR_VA: 20/
OS_SPHERE: +1.25
OS_CYLINDER: -0.75
OD_CYLINDER: -0.75
OD_OVR_VA: 20/
OD_AXIS: 097
OS_VPRISM_DIRECTION: PROGS
OD_SPHERE: +1.25
OS_ADD: +2.50
OS_ADD: +2.25
OS_SPHERE: +2.00
OS_VPRISM_DIRECTION: PROGS
OD_OVR_VA: 20/

## 2025-01-06 ASSESSMENT — REFRACTION_MANIFEST
OS_ADD: +2.50
OD_CYLINDER: -1.00
OD_AXIS: 100
OD_CYLINDER: -1.00
OD_SPHERE: +2.00
OS_SPHERE: +1.75
OS_CYLINDER: -0.75
OS_VA1: 20/20
OS_SPHERE: +1.75
OD_SPHERE: +2.00
OD_AXIS: 100
OD_VA1: 20/20
OS_CYLINDER: -0.75
OS_AXIS: 065
OS_AXIS: 065
OD_VA1: 20/20
OS_VA1: 20/20
OD_ADD: +2.50

## 2025-01-06 ASSESSMENT — SUPERFICIAL PUNCTATE KERATITIS (SPK)
OS_SPK: T
OD_SPK: T

## 2025-01-06 ASSESSMENT — TONOMETRY
OS_IOP_MMHG: 12
OD_IOP_MMHG: 11

## 2025-01-06 ASSESSMENT — REFRACTION_AUTOREFRACTION
OD_CYLINDER: -1.25
OS_CYLINDER: -1.00
OS_AXIS: 066
OD_SPHERE: +2.00
OS_SPHERE: +2.25
OD_AXIS: 100

## 2025-01-06 ASSESSMENT — KERATOMETRY
OS_K2POWER_DIOPTERS: 44.50
OD_K1POWER_DIOPTERS: 44.25
OD_K2POWER_DIOPTERS: 44.75
OS_AXISANGLE_DEGREES: 162
OS_K1POWER_DIOPTERS: 44.00
OD_AXISANGLE_DEGREES: 017

## 2025-01-06 ASSESSMENT — CONFRONTATIONAL VISUAL FIELD TEST (CVF)
OS_FINDINGS: FULL
OD_FINDINGS: FULL

## 2025-01-06 ASSESSMENT — VISUAL ACUITY
OD_BCVA: 20/20-1
OS_BCVA: 20/20